# Patient Record
Sex: FEMALE | URBAN - METROPOLITAN AREA
[De-identification: names, ages, dates, MRNs, and addresses within clinical notes are randomized per-mention and may not be internally consistent; named-entity substitution may affect disease eponyms.]

---

## 2022-01-13 ENCOUNTER — APPOINTMENT (RX ONLY)
Dept: URBAN - METROPOLITAN AREA CLINIC 117 | Facility: CLINIC | Age: 27
Setting detail: DERMATOLOGY
End: 2022-01-13

## 2022-01-13 DIAGNOSIS — D49.2 NEOPLASM OF UNSPECIFIED BEHAVIOR OF BONE, SOFT TISSUE, AND SKIN: ICD-10-CM

## 2022-01-13 DIAGNOSIS — L74.51 PRIMARY FOCAL HYPERHIDROSIS: ICD-10-CM

## 2022-01-13 DIAGNOSIS — D485 NEOPLASM OF UNCERTAIN BEHAVIOR OF SKIN: ICD-10-CM

## 2022-01-13 PROBLEM — L74.510 PRIMARY FOCAL HYPERHIDROSIS, AXILLA: Status: ACTIVE | Noted: 2022-01-13

## 2022-01-13 PROBLEM — D48.5 NEOPLASM OF UNCERTAIN BEHAVIOR OF SKIN: Status: ACTIVE | Noted: 2022-01-13

## 2022-01-13 PROCEDURE — ? PRESCRIPTION

## 2022-01-13 PROCEDURE — ? ADDITIONAL NOTES

## 2022-01-13 PROCEDURE — 11102 TANGNTL BX SKIN SINGLE LES: CPT

## 2022-01-13 PROCEDURE — ? BIOPSY BY SHAVE METHOD

## 2022-01-13 PROCEDURE — 99203 OFFICE O/P NEW LOW 30 MIN: CPT | Mod: 25

## 2022-01-13 PROCEDURE — ? COUNSELING

## 2022-01-13 RX ORDER — CLOBETASOL PROPIONATE 0.5 MG/G
CREAM TOPICAL BID
Qty: 60 | Refills: 1 | Status: ERX | COMMUNITY
Start: 2022-01-13

## 2022-01-13 RX ORDER — GLYCOPYRRONIUM 2.4 G/100G
CLOTH TOPICAL
Qty: 30 | Refills: 1 | Status: ERX | COMMUNITY
Start: 2022-01-13

## 2022-01-13 RX ADMIN — GLYCOPYRRONIUM: 2.4 CLOTH TOPICAL at 00:00

## 2022-01-13 RX ADMIN — CLOBETASOL PROPIONATE: 0.5 CREAM TOPICAL at 00:00

## 2022-01-13 ASSESSMENT — LOCATION DETAILED DESCRIPTION DERM
LOCATION DETAILED: LEFT VENTRAL DISTAL FOREARM
LOCATION DETAILED: RIGHT AXILLARY VAULT
LOCATION DETAILED: LEFT AXILLARY VAULT

## 2022-01-13 ASSESSMENT — LOCATION SIMPLE DESCRIPTION DERM
LOCATION SIMPLE: RIGHT AXILLARY VAULT
LOCATION SIMPLE: LEFT FOREARM
LOCATION SIMPLE: LEFT AXILLARY VAULT

## 2022-01-13 ASSESSMENT — LOCATION ZONE DERM
LOCATION ZONE: ARM
LOCATION ZONE: AXILLAE

## 2022-01-13 NOTE — PROCEDURE: ADDITIONAL NOTES
Additional Notes: Treatment rendered to site that was also biopsied.
Render Risk Assessment In Note?: no
Detail Level: Simple

## 2022-01-13 NOTE — HPI: SKIN LESIONS
How Severe Is Your Skin Lesion?: moderate
treated_been_treated
Is This A New Presentation, Or A Follow-Up?: Skin Lesions
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 Fluticasone propionate ointment. Uses it BID. Has tried cream but it did not work like the ointment dined. Has only been to that one dermatologist appointment and then her primary doctor has since taken over the prescription.

## 2022-01-24 ENCOUNTER — APPOINTMENT (RX ONLY)
Dept: URBAN - METROPOLITAN AREA CLINIC 117 | Facility: CLINIC | Age: 27
Setting detail: DERMATOLOGY
End: 2022-01-24

## 2022-01-24 DIAGNOSIS — B07.8 OTHER VIRAL WARTS: ICD-10-CM

## 2022-01-24 PROCEDURE — ? COUNSELING

## 2022-01-24 PROCEDURE — 17110 DESTRUCTION B9 LES UP TO 14: CPT

## 2022-01-24 PROCEDURE — ? LIQUID NITROGEN

## 2022-01-24 PROCEDURE — ? PRESCRIPTION MEDICATION MANAGEMENT

## 2022-01-24 ASSESSMENT — LOCATION DETAILED DESCRIPTION DERM: LOCATION DETAILED: LEFT VENTRAL DISTAL FOREARM

## 2022-01-24 ASSESSMENT — LOCATION SIMPLE DESCRIPTION DERM: LOCATION SIMPLE: LEFT FOREARM

## 2022-01-24 ASSESSMENT — LOCATION ZONE DERM: LOCATION ZONE: ARM

## 2022-01-24 NOTE — PROCEDURE: LIQUID NITROGEN
Show Topical Anesthesia Variable?: Yes
Medical Necessity Clause: This procedure was medically necessary because the lesions that were treated were:
Spray Paint Technique: No
Spray Paint Text: The liquid nitrogen was applied to the skin utilizing a spray paint frosting technique.
Consent: The patient's consent was obtained including but not limited to risks of crusting, scabbing, blistering, scarring, darker or lighter pigmentary change, recurrence, incomplete removal and infection.
Medical Necessity Information: It is in your best interest to select a reason for this procedure from the list below. All of these items fulfill various CMS LCD requirements except the new and changing color options.
Detail Level: Detailed
Post-Care Instructions: I reviewed with the patient in detail post-care instructions. Patient is to wear sunprotection, and avoid picking at any of the treated lesions. Pt may apply Vaseline to crusted or scabbing areas.

## 2022-01-24 NOTE — PROCEDURE: PRESCRIPTION MEDICATION MANAGEMENT
Discontinue Regimen: Clobetasol due to pathology showing verruca vulgaris
Render In Strict Bullet Format?: No
Detail Level: Zone

## 2023-09-14 ENCOUNTER — HOSPITAL ENCOUNTER (EMERGENCY)
Dept: HOSPITAL 92 - ERS | Age: 28
Discharge: HOME | End: 2023-09-14
Payer: COMMERCIAL

## 2023-09-14 DIAGNOSIS — M54.50: Primary | ICD-10-CM

## 2023-09-14 PROCEDURE — 72131 CT LUMBAR SPINE W/O DYE: CPT

## 2023-09-14 PROCEDURE — 96372 THER/PROPH/DIAG INJ SC/IM: CPT

## 2023-11-02 ENCOUNTER — HOSPITAL ENCOUNTER (OUTPATIENT)
Dept: HOSPITAL 92 - SCSMRI | Age: 28
Discharge: HOME | End: 2023-11-02
Attending: FAMILY MEDICINE
Payer: COMMERCIAL

## 2023-11-02 DIAGNOSIS — M54.50: Primary | ICD-10-CM

## 2023-11-02 DIAGNOSIS — R93.7: ICD-10-CM

## 2023-11-02 PROCEDURE — 72148 MRI LUMBAR SPINE W/O DYE: CPT
